# Patient Record
Sex: MALE | Race: OTHER | HISPANIC OR LATINO | URBAN - METROPOLITAN AREA
[De-identification: names, ages, dates, MRNs, and addresses within clinical notes are randomized per-mention and may not be internally consistent; named-entity substitution may affect disease eponyms.]

---

## 2020-08-26 ENCOUNTER — OFFICE VISIT (OUTPATIENT)
Dept: URBAN - METROPOLITAN AREA CLINIC 63 | Facility: CLINIC | Age: 65
End: 2020-08-26

## 2020-10-05 ENCOUNTER — OFFICE VISIT (OUTPATIENT)
Dept: URBAN - METROPOLITAN AREA CLINIC 121 | Facility: CLINIC | Age: 65
End: 2020-10-05

## 2021-03-04 ENCOUNTER — OFFICE VISIT (OUTPATIENT)
Dept: URBAN - METROPOLITAN AREA CLINIC 63 | Facility: CLINIC | Age: 66
End: 2021-03-04

## 2021-09-24 ENCOUNTER — OFFICE VISIT (OUTPATIENT)
Dept: URBAN - METROPOLITAN AREA CLINIC 63 | Facility: CLINIC | Age: 66
End: 2021-09-24

## 2021-12-03 ENCOUNTER — OFFICE VISIT (OUTPATIENT)
Dept: URBAN - METROPOLITAN AREA MEDICAL CENTER 14 | Facility: MEDICAL CENTER | Age: 66
End: 2021-12-03

## 2021-12-13 ENCOUNTER — OFFICE VISIT (OUTPATIENT)
Dept: URBAN - METROPOLITAN AREA CLINIC 63 | Facility: CLINIC | Age: 66
End: 2021-12-13

## 2022-07-09 ENCOUNTER — TELEPHONE ENCOUNTER (OUTPATIENT)
Dept: URBAN - METROPOLITAN AREA CLINIC 121 | Facility: CLINIC | Age: 67
End: 2022-07-09

## 2022-07-09 RX ORDER — SEVELAMER CARBONATE 800 MG/1
TABLET, FILM COATED ORAL THREE TIMES A DAY
Refills: 0 | OUTPATIENT
Start: 2015-10-13 | End: 2016-01-06

## 2022-07-09 RX ORDER — SIMVASTATIN 20 MG/1
TABLET, FILM COATED ORAL ONCE A DAY
Refills: 0 | OUTPATIENT
Start: 2016-01-06 | End: 2016-02-17

## 2022-07-09 RX ORDER — DORZOLAMIDE/TIMOLOL/PF 2 %-0.5 %
DROPS OPHTHALMIC (EYE)
Refills: 0 | OUTPATIENT
Start: 2021-01-22 | End: 2021-09-24

## 2022-07-09 RX ORDER — FUROSEMIDE 80 MG/1
TABLET ORAL TWICE A DAY
Refills: 0 | OUTPATIENT
Start: 2019-02-20 | End: 2019-03-27

## 2022-07-09 RX ORDER — CALCITRIOL 0.5 UG/1
CAPSULE, LIQUID FILLED ORAL ONCE A DAY
Refills: 0 | OUTPATIENT
Start: 2016-01-06 | End: 2016-02-17

## 2022-07-09 RX ORDER — INSULIN ASPART 100 [IU]/ML
INJECTION, SOLUTION INTRAVENOUS; SUBCUTANEOUS
Refills: 0 | OUTPATIENT
Start: 2020-08-14 | End: 2021-09-24

## 2022-07-09 RX ORDER — TAMSULOSIN HYDROCHLORIDE 0.4 MG/1
CAPSULE ORAL ONCE A DAY
Refills: 0 | OUTPATIENT
Start: 2018-04-20 | End: 2019-02-20

## 2022-07-09 RX ORDER — INSULIN GLARGINE 100 [IU]/ML
INJECTION, SOLUTION SUBCUTANEOUS TAKE AS DIRECTED
Refills: 0 | OUTPATIENT
Start: 2018-08-17 | End: 2020-02-26

## 2022-07-09 RX ORDER — TAMSULOSIN HYDROCHLORIDE 0.4 MG/1
CAPSULE ORAL ONCE A DAY
Refills: 0 | OUTPATIENT
Start: 2018-01-22 | End: 2018-02-02

## 2022-07-09 RX ORDER — INSULIN GLARGINE 100 [IU]/ML
INJECTION, SOLUTION SUBCUTANEOUS
Refills: 0 | OUTPATIENT
Start: 2021-09-24 | End: 2021-09-24

## 2022-07-09 RX ORDER — SIMVASTATIN 20 MG/1
TABLET, FILM COATED ORAL ONCE A DAY
Refills: 0 | OUTPATIENT
Start: 2018-01-22 | End: 2018-02-02

## 2022-07-09 RX ORDER — INSULIN ASPART 100 [IU]/ML
INJECTION, SOLUTION INTRAVENOUS; SUBCUTANEOUS
Refills: 0 | OUTPATIENT
Start: 2021-09-24 | End: 2021-09-24

## 2022-07-09 RX ORDER — METOPROLOL SUCCINATE 25 MG/1
TABLET, EXTENDED RELEASE ORAL TWICE A DAY
Refills: 0 | OUTPATIENT
Start: 2019-03-27 | End: 2019-08-21

## 2022-07-09 RX ORDER — INSULIN GLARGINE 100 [IU]/ML
INJECTION, SOLUTION SUBCUTANEOUS
Refills: 0 | OUTPATIENT
Start: 2020-08-18 | End: 2021-09-24

## 2022-07-09 RX ORDER — CINACALCET HYDROCHLORIDE 30 MG/1
FRI & SUN TABLET, COATED ORAL TAKE AS DIRECTED
Refills: 0 | OUTPATIENT
Start: 2015-09-08 | End: 2016-01-06

## 2022-07-09 RX ORDER — ATORVASTATIN CALCIUM 20 MG/1
TABLET, FILM COATED ORAL ONCE A DAY
Refills: 0 | OUTPATIENT
Start: 2019-02-20 | End: 2019-03-27

## 2022-07-09 RX ORDER — LISINOPRIL 20 MG/1
TABLET ORAL
Refills: 0 | OUTPATIENT
Start: 2021-02-17 | End: 2021-09-24

## 2022-07-09 RX ORDER — INSULIN GLARGINE 100 [IU]/ML
INJECTION, SOLUTION SUBCUTANEOUS
Refills: 0 | OUTPATIENT
Start: 2018-01-22 | End: 2018-02-02

## 2022-07-09 RX ORDER — INSULIN LISPRO 100 [IU]/ML
INJECTION, SOLUTION INTRAVENOUS; SUBCUTANEOUS AS NEEDED
Refills: 0 | OUTPATIENT
Start: 2015-09-08 | End: 2015-10-13

## 2022-07-09 RX ORDER — LISINOPRIL 10 MG/1
TABLET ORAL TWICE A DAY
Refills: 2 | OUTPATIENT
Start: 2016-02-17 | End: 2016-02-17

## 2022-07-09 RX ORDER — ATORVASTATIN CALCIUM 20 MG/1
TABLET, FILM COATED ORAL ONCE A DAY
Refills: 0 | OUTPATIENT
Start: 2019-03-27 | End: 2020-02-26

## 2022-07-09 RX ORDER — LANCETS 30 GAUGE
EACH MISCELLANEOUS
Refills: 0 | OUTPATIENT
Start: 2021-08-17 | End: 2021-09-24

## 2022-07-09 RX ORDER — FUROSEMIDE 80 MG/1
TABLET ORAL TWICE A DAY
Refills: 2 | OUTPATIENT
Start: 2016-02-17 | End: 2018-01-22

## 2022-07-09 RX ORDER — ASPIRIN 81 MG/1
TABLET, DELAYED RELEASE ORAL
Refills: 0 | OUTPATIENT
Start: 2020-08-26 | End: 2021-09-24

## 2022-07-09 RX ORDER — INSULIN LISPRO 100 [IU]/ML
INJECTION, SOLUTION INTRAVENOUS; SUBCUTANEOUS AS NEEDED
Refills: 0 | OUTPATIENT
Start: 2015-10-13 | End: 2016-01-06

## 2022-07-09 RX ORDER — METOPROLOL SUCCINATE 25 MG/1
TABLET, EXTENDED RELEASE ORAL
Refills: 0 | OUTPATIENT
Start: 2020-08-21 | End: 2021-03-04

## 2022-07-09 RX ORDER — INSULIN ASPART 100 [IU]/ML
INJECTION, SOLUTION INTRAVENOUS; SUBCUTANEOUS TAKE AS DIRECTED
Refills: 0 | OUTPATIENT
Start: 2018-08-16 | End: 2020-02-26

## 2022-07-09 RX ORDER — ROSUVASTATIN CALCIUM 20 MG/1
TABLET, FILM COATED ORAL
Refills: 0 | OUTPATIENT
Start: 2021-08-04 | End: 2021-09-24

## 2022-07-09 RX ORDER — INSULIN ASPART 100 [IU]/ML
INJECTION, SOLUTION INTRAVENOUS; SUBCUTANEOUS
Refills: 0 | OUTPATIENT
Start: 2018-01-22 | End: 2018-02-02

## 2022-07-09 RX ORDER — INSULIN GLARGINE 100 [IU]/ML
INJECTION, SOLUTION SUBCUTANEOUS TAKE AS DIRECTED
Refills: 0 | OUTPATIENT
Start: 2012-04-19 | End: 2015-09-08

## 2022-07-09 RX ORDER — HUMAN INSULIN 100 [IU]/ML
INJECTION, SOLUTION SUBCUTANEOUS TAKE AS DIRECTED
Refills: 0 | OUTPATIENT
Start: 2012-04-27 | End: 2015-09-08

## 2022-07-09 RX ORDER — BLOOD-GLUCOSE METER
EACH MISCELLANEOUS
Refills: 0 | OUTPATIENT
Start: 2021-08-17 | End: 2021-09-24

## 2022-07-09 RX ORDER — FUROSEMIDE 80 MG/1
TABLET ORAL TWICE A DAY
Refills: 2 | OUTPATIENT
Start: 2018-01-22 | End: 2018-02-02

## 2022-07-09 RX ORDER — ATORVASTATIN CALCIUM 20 MG/1
TABLET, FILM COATED ORAL ONCE A DAY
Refills: 0 | OUTPATIENT
Start: 2020-02-26 | End: 2021-09-24

## 2022-07-09 RX ORDER — CLOPIDOGREL 75 MG/1
TABLET ORAL ONCE A DAY
Refills: 0 | OUTPATIENT
Start: 2019-02-20 | End: 2019-03-27

## 2022-07-09 RX ORDER — HUMAN INSULIN 100 [IU]/ML
INJECTION, SOLUTION SUBCUTANEOUS TAKE AS DIRECTED
Refills: 0 | OUTPATIENT
Start: 2015-09-08 | End: 2015-09-08

## 2022-07-09 RX ORDER — INSULIN LISPRO 100 [IU]/ML
INJECTION, SOLUTION INTRAVENOUS; SUBCUTANEOUS AS NEEDED
Refills: 0 | OUTPATIENT
Start: 2016-01-06 | End: 2016-02-17

## 2022-07-09 RX ORDER — METOPROLOL SUCCINATE 25 MG/1
TABLET, EXTENDED RELEASE ORAL TWICE A DAY
Refills: 0 | OUTPATIENT
Start: 2019-02-20 | End: 2019-03-27

## 2022-07-09 RX ORDER — SIMVASTATIN 10 MG/1
TABLET, FILM COATED ORAL ONCE A DAY
Refills: 0 | OUTPATIENT
Start: 2015-09-08 | End: 2015-10-13

## 2022-07-09 RX ORDER — CALCITRIOL 0.5 UG/1
CAPSULE, LIQUID FILLED ORAL ONCE A DAY
Refills: 0 | OUTPATIENT
Start: 2015-10-13 | End: 2016-01-06

## 2022-07-09 RX ORDER — CARVEDILOL 12.5 MG/1
TABLET, FILM COATED ORAL
Refills: 0 | OUTPATIENT
Start: 2021-08-17 | End: 2021-09-24

## 2022-07-09 RX ORDER — CLOPIDOGREL 75 MG/1
TABLET ORAL ONCE A DAY
Refills: 0 | OUTPATIENT
Start: 2019-03-27 | End: 2020-02-26

## 2022-07-09 RX ORDER — FUROSEMIDE 80 MG/1
TABLET ORAL
Refills: 0 | OUTPATIENT
Start: 2021-08-16 | End: 2021-09-24

## 2022-07-09 RX ORDER — LANTHANUM CARBONATE 1000 MG/1
TABLET, CHEWABLE ORAL TAKE AS DIRECTED
Refills: 0 | OUTPATIENT
Start: 2019-03-27 | End: 2020-02-26

## 2022-07-09 RX ORDER — AMLODIPINE BESYLATE 5 MG/1
TABLET ORAL
Refills: 0 | OUTPATIENT
Start: 2020-08-12 | End: 2021-03-04

## 2022-07-09 RX ORDER — SIMVASTATIN 20 MG/1
TABLET, FILM COATED ORAL ONCE A DAY
Refills: 0 | OUTPATIENT
Start: 2018-06-04 | End: 2019-02-20

## 2022-07-09 RX ORDER — CLOPIDOGREL 75 MG/1
TABLET ORAL ONCE A DAY
Refills: 0 | OUTPATIENT
Start: 2020-02-26 | End: 2021-09-24

## 2022-07-09 RX ORDER — FUROSEMIDE 80 MG/1
TABLET ORAL TWICE A DAY
Refills: 0 | OUTPATIENT
Start: 2019-03-27 | End: 2020-02-26

## 2022-07-09 RX ORDER — LANTHANUM CARBONATE 1000 MG/1
TABLET, CHEWABLE ORAL TAKE AS DIRECTED
Refills: 0 | OUTPATIENT
Start: 2020-02-26 | End: 2021-09-24

## 2022-07-09 RX ORDER — INSULIN DETEMIR 100 [IU]/ML
INJECTION, SOLUTION SUBCUTANEOUS ONCE A DAY
Refills: 0 | OUTPATIENT
Start: 2015-10-13 | End: 2016-01-06

## 2022-07-09 RX ORDER — SIMVASTATIN 20 MG/1
TABLET, FILM COATED ORAL ONCE A DAY
Refills: 0 | OUTPATIENT
Start: 2015-10-13 | End: 2016-01-06

## 2022-07-09 RX ORDER — BLOOD SUGAR DIAGNOSTIC
STRIP MISCELLANEOUS
Refills: 0 | OUTPATIENT
Start: 2021-08-17 | End: 2021-09-24

## 2022-07-09 RX ORDER — CLOPIDOGREL 75 MG/1
TABLET ORAL
Refills: 0 | OUTPATIENT
Start: 2021-09-13 | End: 2021-09-24

## 2022-07-09 RX ORDER — FUROSEMIDE 80 MG/1
TABLET ORAL TWICE A DAY
Refills: 0 | OUTPATIENT
Start: 2020-02-26 | End: 2021-09-24

## 2022-07-09 RX ORDER — HYDRALAZINE HYDROCHLORIDE 50 MG/1
TABLET ORAL
Refills: 0 | OUTPATIENT
Start: 2021-09-08 | End: 2021-09-24

## 2022-07-09 RX ORDER — AMLODIPINE BESYLATE 10 MG/1
TABLET ORAL ONCE A DAY
Refills: 0 | OUTPATIENT
Start: 2016-01-06 | End: 2016-02-17

## 2022-07-10 ENCOUNTER — TELEPHONE ENCOUNTER (OUTPATIENT)
Dept: URBAN - METROPOLITAN AREA CLINIC 121 | Facility: CLINIC | Age: 67
End: 2022-07-10

## 2022-07-10 RX ORDER — ROSUVASTATIN CALCIUM 20 MG/1
ONCE IN THE EVENING TABLET, FILM COATED ORAL ONCE A DAY
Refills: 0 | Status: ACTIVE | COMMUNITY
Start: 2021-09-24

## 2022-07-10 RX ORDER — CLOPIDOGREL 75 MG/1
TABLET ORAL TWICE A DAY
Refills: 0 | Status: ACTIVE | COMMUNITY
Start: 2021-09-24

## 2022-07-10 RX ORDER — FUROSEMIDE 80 MG/1
TABLET ORAL
Refills: 0 | Status: ACTIVE | COMMUNITY
Start: 2021-09-24

## 2022-07-10 RX ORDER — INSULIN GLARGINE 100 [IU]/ML
INJECTION, SOLUTION SUBCUTANEOUS
Refills: 0 | Status: ACTIVE | COMMUNITY
Start: 2018-02-02

## 2022-07-10 RX ORDER — CALCITRIOL 0.5 UG/1
CAPSULE, LIQUID FILLED ORAL ONCE A DAY
Refills: 2 | Status: ACTIVE | COMMUNITY
Start: 2016-02-17

## 2022-07-10 RX ORDER — LANCETS 30 GAUGE
EACH MISCELLANEOUS
Refills: 0 | Status: ACTIVE | COMMUNITY
Start: 2021-09-24

## 2022-07-10 RX ORDER — CINACALCET HYDROCHLORIDE 30 MG/1
FRI & SUN TABLET, COATED ORAL TAKE AS DIRECTED
Refills: 0 | Status: ACTIVE | COMMUNITY
Start: 2016-01-06

## 2022-07-10 RX ORDER — AMLODIPINE BESYLATE 10 MG/1
TABLET ORAL ONCE A DAY
Refills: 2 | Status: ACTIVE | COMMUNITY
Start: 2016-02-17

## 2022-07-10 RX ORDER — HYDRALAZINE HYDROCHLORIDE 100 MG/1
TABLET ORAL TWICE A DAY
Refills: 0 | Status: ACTIVE | COMMUNITY
Start: 2021-09-24

## 2022-07-10 RX ORDER — BLOOD SUGAR DIAGNOSTIC
STRIP MISCELLANEOUS
Refills: 0 | Status: ACTIVE | COMMUNITY
Start: 2021-09-24

## 2022-07-10 RX ORDER — INSULIN GLARGINE 100 [IU]/ML
INJECTION, SOLUTION SUBCUTANEOUS TAKE AS DIRECTED
Refills: 0 | Status: ACTIVE | COMMUNITY
Start: 2020-02-26

## 2022-07-10 RX ORDER — FUROSEMIDE 80 MG/1
TABLET ORAL TWICE A DAY
Refills: 2 | Status: ACTIVE | COMMUNITY
Start: 2018-02-02

## 2022-07-10 RX ORDER — ASPIRIN 81 MG/1
ONE IN THE EVENING TABLET, DELAYED RELEASE ORAL ONCE A DAY
Refills: 0 | Status: ACTIVE | COMMUNITY
Start: 2021-09-24

## 2022-07-10 RX ORDER — FIDAXOMICIN 200 MG/1
TWICE A DAY TABLET, FILM COATED ORAL TWICE A DAY
Refills: 0 | Status: ACTIVE | COMMUNITY
Start: 2018-06-14

## 2022-07-10 RX ORDER — INSULIN ASPART 100 [IU]/ML
INJECTION, SOLUTION INTRAVENOUS; SUBCUTANEOUS TAKE AS DIRECTED
Refills: 0 | Status: ACTIVE | COMMUNITY
Start: 2020-02-26

## 2022-07-10 RX ORDER — INSULIN ASPART 100 [IU]/ML
SLIDING SCALE INJECTION, SOLUTION INTRAVENOUS; SUBCUTANEOUS TAKE AS DIRECTED
Refills: 0 | Status: ACTIVE | COMMUNITY
Start: 2021-09-24

## 2022-07-10 RX ORDER — INSULIN ASPART 100 [IU]/ML
INJECTION, SOLUTION INTRAVENOUS; SUBCUTANEOUS
Refills: 0 | Status: ACTIVE | COMMUNITY
Start: 2018-02-02

## 2022-07-10 RX ORDER — INSULIN GLARGINE 100 [IU]/ML
60 UNITS IN THE MORNING INJECTION, SOLUTION SUBCUTANEOUS TAKE AS DIRECTED
Refills: 0 | Status: ACTIVE | COMMUNITY
Start: 2021-09-24

## 2022-07-10 RX ORDER — LISINOPRIL 20 MG/1
TABLET ORAL TWICE A DAY
Refills: 0 | Status: ACTIVE | COMMUNITY
Start: 2021-09-24

## 2022-07-10 RX ORDER — CARVEDILOL 12.5 MG/1
TABLET, FILM COATED ORAL TWICE A DAY
Refills: 0 | Status: ACTIVE | COMMUNITY
Start: 2021-09-24

## 2022-07-10 RX ORDER — TAMSULOSIN HYDROCHLORIDE 0.4 MG/1
CAPSULE ORAL ONCE A DAY
Refills: 0 | Status: ACTIVE | COMMUNITY
Start: 2018-02-02

## 2022-07-10 RX ORDER — SEVELAMER CARBONATE 800 MG/1
TABLET, FILM COATED ORAL THREE TIMES A DAY
Refills: 0 | Status: ACTIVE | COMMUNITY
Start: 2016-01-06

## 2022-07-10 RX ORDER — HYDRALAZINE HYDROCHLORIDE 50 MG/1
TABLET ORAL TWICE A DAY
Refills: 2 | Status: ACTIVE | COMMUNITY
Start: 2016-02-17

## 2022-07-10 RX ORDER — POLYETHYLENE GLYCOL 3350, SODIUM SULFATE ANHYDROUS, SODIUM BICARBONATE, SODIUM CHLORIDE, POTASSIUM CHLORIDE 236; 22.74; 6.74; 5.86; 2.97 G/4L; G/4L; G/4L; G/4L; G/4L
USE AS DIRECTED DRINK 8 OZ EVERY 10 MINUTES UNTIL BOWELS ARE CLEAR POWDER, FOR SOLUTION ORAL
Refills: 0 | Status: ACTIVE | COMMUNITY
Start: 2021-09-24

## 2022-07-10 RX ORDER — INSULIN LISPRO 100 [IU]/ML
INJECTION, SOLUTION INTRAVENOUS; SUBCUTANEOUS AS NEEDED
Refills: 2 | Status: ACTIVE | COMMUNITY
Start: 2016-02-17

## 2022-07-10 RX ORDER — SIMVASTATIN 20 MG/1
TABLET, FILM COATED ORAL ONCE A DAY
Refills: 2 | Status: ACTIVE | COMMUNITY
Start: 2016-02-17

## 2022-07-10 RX ORDER — INSULIN DETEMIR 100 [IU]/ML
INJECTION, SOLUTION SUBCUTANEOUS ONCE A DAY
Refills: 0 | Status: ACTIVE | COMMUNITY
Start: 2016-01-06

## 2022-07-10 RX ORDER — BLOOD-GLUCOSE METER
EACH MISCELLANEOUS
Refills: 0 | Status: ACTIVE | COMMUNITY
Start: 2021-09-24

## 2022-07-10 RX ORDER — SIMVASTATIN 20 MG/1
TABLET, FILM COATED ORAL ONCE A DAY
Refills: 0 | Status: ACTIVE | COMMUNITY
Start: 2018-02-02

## 2023-12-13 ENCOUNTER — OFFICE VISIT (OUTPATIENT)
Dept: URBAN - METROPOLITAN AREA CLINIC 63 | Facility: CLINIC | Age: 68
End: 2023-12-13

## 2023-12-20 ENCOUNTER — DASHBOARD ENCOUNTERS (OUTPATIENT)
Age: 68
End: 2023-12-20

## 2023-12-20 ENCOUNTER — OFFICE VISIT (OUTPATIENT)
Dept: URBAN - METROPOLITAN AREA CLINIC 63 | Facility: CLINIC | Age: 68
End: 2023-12-20

## 2023-12-20 RX ORDER — CARVEDILOL 12.5 MG/1
TABLET, FILM COATED ORAL TWICE A DAY
Refills: 0 | COMMUNITY
Start: 2021-09-24

## 2023-12-20 RX ORDER — BLOOD SUGAR DIAGNOSTIC
STRIP MISCELLANEOUS
Refills: 0 | COMMUNITY
Start: 2021-09-24

## 2023-12-20 RX ORDER — ASPIRIN 81 MG/1
ONE IN THE EVENING TABLET, DELAYED RELEASE ORAL ONCE A DAY
Refills: 0 | COMMUNITY
Start: 2021-09-24

## 2023-12-20 RX ORDER — INSULIN DETEMIR 100 [IU]/ML
INJECTION, SOLUTION SUBCUTANEOUS ONCE A DAY
Refills: 0 | COMMUNITY
Start: 2016-01-06

## 2023-12-20 RX ORDER — CALCITRIOL 0.5 UG/1
CAPSULE, LIQUID FILLED ORAL ONCE A DAY
Refills: 2 | COMMUNITY
Start: 2016-02-17

## 2023-12-20 RX ORDER — LISINOPRIL 20 MG/1
TABLET ORAL TWICE A DAY
Refills: 0 | COMMUNITY
Start: 2021-09-24

## 2023-12-20 RX ORDER — POLYETHYLENE GLYCOL 3350, SODIUM SULFATE ANHYDROUS, SODIUM BICARBONATE, SODIUM CHLORIDE, POTASSIUM CHLORIDE 236; 22.74; 6.74; 5.86; 2.97 G/4L; G/4L; G/4L; G/4L; G/4L
USE AS DIRECTED DRINK 8 OZ EVERY 10 MINUTES UNTIL BOWELS ARE CLEAR POWDER, FOR SOLUTION ORAL
Refills: 0 | COMMUNITY
Start: 2021-09-24

## 2023-12-20 RX ORDER — TAMSULOSIN HYDROCHLORIDE 0.4 MG/1
CAPSULE ORAL ONCE A DAY
Refills: 0 | COMMUNITY
Start: 2018-02-02

## 2023-12-20 RX ORDER — FIDAXOMICIN 200 MG/1
TWICE A DAY TABLET, FILM COATED ORAL TWICE A DAY
Refills: 0 | COMMUNITY
Start: 2018-06-14

## 2023-12-20 RX ORDER — BLOOD-GLUCOSE METER
EACH MISCELLANEOUS
Refills: 0 | COMMUNITY
Start: 2021-09-24

## 2023-12-20 RX ORDER — INSULIN GLARGINE 100 [IU]/ML
INJECTION, SOLUTION SUBCUTANEOUS
Refills: 0 | COMMUNITY
Start: 2018-02-02

## 2023-12-20 RX ORDER — CINACALCET HYDROCHLORIDE 30 MG/1
FRI & SUN TABLET, COATED ORAL TAKE AS DIRECTED
Refills: 0 | COMMUNITY
Start: 2016-01-06

## 2023-12-20 RX ORDER — SIMVASTATIN 20 MG/1
TABLET, FILM COATED ORAL ONCE A DAY
Refills: 0 | COMMUNITY
Start: 2018-02-02

## 2023-12-20 RX ORDER — HYDRALAZINE HYDROCHLORIDE 100 MG/1
TABLET ORAL TWICE A DAY
Refills: 0 | COMMUNITY
Start: 2021-09-24

## 2023-12-20 RX ORDER — ROSUVASTATIN CALCIUM 20 MG/1
ONCE IN THE EVENING TABLET, FILM COATED ORAL ONCE A DAY
Refills: 0 | COMMUNITY
Start: 2021-09-24

## 2023-12-20 RX ORDER — INSULIN LISPRO 100 [IU]/ML
INJECTION, SOLUTION INTRAVENOUS; SUBCUTANEOUS AS NEEDED
Refills: 2 | COMMUNITY
Start: 2016-02-17

## 2023-12-20 RX ORDER — AMLODIPINE BESYLATE 10 MG/1
TABLET ORAL ONCE A DAY
Refills: 2 | COMMUNITY
Start: 2016-02-17

## 2023-12-20 RX ORDER — SEVELAMER CARBONATE 800 MG/1
TABLET, FILM COATED ORAL THREE TIMES A DAY
Refills: 0 | COMMUNITY
Start: 2016-01-06

## 2023-12-20 RX ORDER — CLOPIDOGREL 75 MG/1
TABLET ORAL TWICE A DAY
Refills: 0 | COMMUNITY
Start: 2021-09-24

## 2023-12-20 RX ORDER — LANCETS 30 GAUGE
EACH MISCELLANEOUS
Refills: 0 | COMMUNITY
Start: 2021-09-24

## 2023-12-20 RX ORDER — FUROSEMIDE 80 MG/1
TABLET ORAL TWICE A DAY
Refills: 2 | COMMUNITY
Start: 2018-02-02

## 2023-12-20 RX ORDER — INSULIN ASPART 100 [IU]/ML
SLIDING SCALE INJECTION, SOLUTION INTRAVENOUS; SUBCUTANEOUS TAKE AS DIRECTED
Refills: 0 | COMMUNITY
Start: 2021-09-24

## 2023-12-20 RX ORDER — HYDRALAZINE HYDROCHLORIDE 50 MG/1
TABLET ORAL TWICE A DAY
Refills: 2 | COMMUNITY
Start: 2016-02-17

## 2023-12-20 NOTE — HPI-PREVIOUS PROCEDURES
EGD/14 February 2021 (Onofre Manzano DO). POST-OP DIAGNOSIS: 1. Mild to moderate fundic erosive gastritis 2. Mild to moderate erosive esophagitis (LA classification grade B) 3. Normal duodenum to D3 without obstructive process 4. No evidence of gastric neoplasm PATHOLOGY: Gastric biopsy demonstrates erosive gastritis without evidence of H. pylori. PLAN: 1. At this time, there is no evidence of an upper GI neoplastic process as suggested by CT of the abdomen. ********** Colonoscopy/14 August 2015 (Yemi Romero MD). Small hyperplastic polyp removed from the sigmoid colon. Internal hemorrhoids. Recommend repeat colonoscopy in 5 years.

## 2023-12-20 NOTE — HPI-TODAY'S VISIT:
New patient: Surveillance colonoscopy  PMH: CAD (PCI with stent x 5), HTN, HLD, PVD, DM2, ESRD (dialysis T, TH, Sa), hypothyroidism, lymphoma, COVID-19, gangrene, HCV, cirrhosis PSH: parotidectomy, septoplasty, multiple amputations, AV fistula placement

## 2023-12-20 NOTE — HPI-PREVIOUS IMAGING
CT abdomen pelvis without contrast/01 February 2022. Bilateral infiltrates, felt secondary to pneumonia. ********** RUQ ultrasound/08 September 2021. 1. Stable appearance of the liver with heterogeneous parenchymal echogenicity and mildly coarsened echotexture suggestive of hepatocellular disease. No focal hepatic lesion is identified. 2. Normal gallbladder. No biliary ductal dilation. 3. Right kidney is small in size with increased cortical echogenicity compatible with medical renal disease.

## 2023-12-20 NOTE — HPI-PREVIOUS LABS
Lab work dated 09 June 2023 demonstrates the following abnormalities: RBC 3.69, hemoglobin 11.0, hematocrit 33.5%, platelets 142, potassium 5.4, glucose 166, BUN 35, creatinine 6.95, BUN/creatinine ratio of 6, calcium 11.2, AST 66, GFR 9. All remaining lab values of CBC, CMP and CRP are within normal limits.

## 2024-01-11 ENCOUNTER — TELEPHONE ENCOUNTER (OUTPATIENT)
Dept: URBAN - METROPOLITAN AREA CLINIC 64 | Facility: CLINIC | Age: 69
End: 2024-01-11

## 2024-02-26 ENCOUNTER — OV EP (OUTPATIENT)
Dept: URBAN - METROPOLITAN AREA CLINIC 63 | Facility: CLINIC | Age: 69
End: 2024-02-26